# Patient Record
Sex: FEMALE | Race: WHITE | HISPANIC OR LATINO | Employment: FULL TIME | URBAN - METROPOLITAN AREA
[De-identification: names, ages, dates, MRNs, and addresses within clinical notes are randomized per-mention and may not be internally consistent; named-entity substitution may affect disease eponyms.]

---

## 2019-08-02 ENCOUNTER — OFFICE VISIT (OUTPATIENT)
Dept: FAMILY MEDICINE CLINIC | Facility: CLINIC | Age: 29
End: 2019-08-02

## 2019-08-02 VITALS
HEIGHT: 65 IN | RESPIRATION RATE: 16 BRPM | TEMPERATURE: 98.5 F | HEART RATE: 74 BPM | SYSTOLIC BLOOD PRESSURE: 118 MMHG | BODY MASS INDEX: 44.35 KG/M2 | WEIGHT: 266.2 LBS | DIASTOLIC BLOOD PRESSURE: 78 MMHG

## 2019-08-02 DIAGNOSIS — E66.01 CLASS 3 SEVERE OBESITY WITH BODY MASS INDEX (BMI) OF 40.0 TO 44.9 IN ADULT, UNSPECIFIED OBESITY TYPE, UNSPECIFIED WHETHER SERIOUS COMORBIDITY PRESENT (HCC): ICD-10-CM

## 2019-08-02 DIAGNOSIS — Z13.1 SCREENING FOR DIABETES MELLITUS: ICD-10-CM

## 2019-08-02 DIAGNOSIS — Z00.00 HEALTHCARE MAINTENANCE: Primary | ICD-10-CM

## 2019-08-02 DIAGNOSIS — R42 LIGHTHEADEDNESS: ICD-10-CM

## 2019-08-02 DIAGNOSIS — Z13.220 SCREENING FOR HYPERLIPIDEMIA: ICD-10-CM

## 2019-08-02 PROBLEM — E66.813 CLASS 3 SEVERE OBESITY WITH BODY MASS INDEX (BMI) OF 40.0 TO 44.9 IN ADULT (HCC): Status: ACTIVE | Noted: 2019-08-02

## 2019-08-02 PROCEDURE — 99385 PREV VISIT NEW AGE 18-39: CPT | Performed by: FAMILY MEDICINE

## 2019-08-02 RX ORDER — IBUPROFEN 800 MG/1
800 TABLET ORAL EVERY 6 HOURS PRN
COMMUNITY

## 2019-08-02 NOTE — ASSESSMENT & PLAN NOTE
Lightheadedness dizziness and nausea likely from not eating or drinking all day  Advised patient to continue to eat a regular breakfast and lunch along with keeping well hydrated throughout the day  Follow-up in 4 weeks  Should lightheadedness and dizziness not improve on lifestyle modifications consider other causes such as vertigo

## 2019-08-02 NOTE — PROGRESS NOTES
Assessment/Plan:    Healthcare maintenance  - Lipid panel for hyperlipidemia screening  -Cmp for diabetes screening   - Depressionsscreening negative  -Low risk sexual behavior  STD screening declined   - Last pap 4 years ago  Will make daniel with women's health for one  Lightheadedness  Lightheadedness dizziness and nausea likely from not eating or drinking all day  Advised patient to continue to eat a regular breakfast and lunch along with keeping well hydrated throughout the day  Follow-up in 4 weeks  Should lightheadedness and dizziness not improve on lifestyle modifications consider other causes such as vertigo  Class 3 severe obesity with body mass index (BMI) of 40 0 to 44 9 in [facility])  Counseled patient on  healthy diet and exercise  She says she has no time to exercise  Her diet is poor which says she will try to improve in the next few weeks  Problem List Items Addressed This Visit        Other    Healthcare maintenance - Primary     - Lipid panel for hyperlipidemia screening  -Cmp for diabetes screening   - Depressionsscreening negative  -Low risk sexual behavior  STD screening declined   - Last pap 4 years ago  Will make daniel with women's health for one  Lightheadedness     Lightheadedness dizziness and nausea likely from not eating or drinking all day  Advised patient to continue to eat a regular breakfast and lunch along with keeping well hydrated throughout the day  Follow-up in 4 weeks  Should lightheadedness and dizziness not improve on lifestyle modifications consider other causes such as vertigo  Class 3 severe obesity with body mass index (BMI) of 40 0 to 44 9 in [facility])     Counseled patient on  healthy diet and exercise  She says she has no time to exercise  Her diet is poor which says she will try to improve in the next few weeks             Other Visit Diagnoses     Screening for diabetes mellitus        Relevant Orders    Comprehensive metabolic panel            Subjective:      Patient ID: Prashanth Oswald is a 30 y o  female  Estephanie Zheng is a 21-year-old female presents to clinic to establish care  She has no significant medical history  No surgical hx   No hospitalizations  in the past  Currently not taking any medications  Today she says she has been dizzy/lightheaded and felt nauseous for the last 2 weeks  She says this started after she came back from vacation  She admits not eating or drinking anything most  days because of work  She does some strenuous activity at work  She says she feels much better after eating  Dizziness/lightheadedness  is not related to anything specific  It is not worse with changes in position  She does not feel like the room is spinning   No ear pain or ringing in the ears  No vomiting no fevers chills palpitations shortness of breath chest pain syncope headaches visual disturbances  Social hx: social drinker, no tobacco or recreational drug use  Currently sexual active with 1 partner  Her last Pap smear was 4 years ago  The following portions of the patient's history were reviewed and updated as appropriate: allergies, current medications, past family history, past medical history, past social history, past surgical history and problem list     Review of Systems   Constitutional: Negative for chills, diaphoresis and fever  HENT: Negative for tinnitus  Eyes: Negative for visual disturbance  Respiratory: Negative for shortness of breath  Cardiovascular: Negative for chest pain and palpitations  Gastrointestinal: Positive for nausea  Negative for abdominal pain, diarrhea and vomiting  Neurological: Positive for dizziness and light-headedness  Negative for syncope and headaches  All other systems reviewed and are negative          Objective:      /78 (BP Location: Left arm, Patient Position: Sitting, Cuff Size: Standard)   Pulse 74   Temp 98 5 °F (36 9 °C) (Tympanic)   Resp 16    5' 4 5" (1 638 m)   Wt 121 kg (266 lb 3 2 oz)   BMI 44 99 kg/m²          Physical Exam   Constitutional: She is oriented to person, place, and time  No distress  HENT:   Right Ear: External ear normal    Left Ear: External ear normal    Mouth/Throat: Oropharynx is clear and moist    Eyes: Conjunctivae are normal    Neck: Normal range of motion  Neck supple  Cardiovascular: Normal rate, regular rhythm and normal heart sounds  Pulmonary/Chest: Effort normal and breath sounds normal  No respiratory distress  Abdominal: Soft  Bowel sounds are normal    Musculoskeletal: Normal range of motion  Neurological: She is alert and oriented to person, place, and time  No cranial nerve deficit  Skin: She is not diaphoretic  Nursing note and vitals reviewed

## 2019-08-02 NOTE — ASSESSMENT & PLAN NOTE
Counseled patient on  healthy diet and exercise  She says she has no time to exercise  Her diet is poor which says she will try to improve in the next few weeks

## 2019-08-02 NOTE — ASSESSMENT & PLAN NOTE
- Lipid panel for hyperlipidemia screening  -Cmp for diabetes screening   - Depressionsscreening negative  -Low risk sexual behavior  STD screening declined   - Last pap 4 years ago  Will make daniel with women's health for one

## 2020-06-26 ENCOUNTER — OFFICE VISIT (OUTPATIENT)
Dept: FAMILY MEDICINE CLINIC | Facility: CLINIC | Age: 30
End: 2020-06-26

## 2020-06-26 ENCOUNTER — TELEPHONE (OUTPATIENT)
Dept: DERMATOLOGY | Facility: CLINIC | Age: 30
End: 2020-06-26

## 2020-06-26 VITALS
RESPIRATION RATE: 16 BRPM | DIASTOLIC BLOOD PRESSURE: 76 MMHG | TEMPERATURE: 99 F | BODY MASS INDEX: 45.8 KG/M2 | WEIGHT: 271 LBS | HEART RATE: 80 BPM | SYSTOLIC BLOOD PRESSURE: 108 MMHG

## 2020-06-26 DIAGNOSIS — R22.0 SUPERFICIAL SWELLING OF SCALP: Primary | ICD-10-CM

## 2020-06-26 PROCEDURE — 99214 OFFICE O/P EST MOD 30 MIN: CPT | Performed by: FAMILY MEDICINE

## 2020-06-26 PROCEDURE — 1036F TOBACCO NON-USER: CPT | Performed by: FAMILY MEDICINE

## 2020-06-26 RX ORDER — KETOCONAZOLE 20 MG/ML
1 SHAMPOO TOPICAL 2 TIMES WEEKLY
Qty: 100 ML | Refills: 0 | Status: SHIPPED | OUTPATIENT
Start: 2020-06-29 | End: 2020-07-20

## 2020-07-20 DIAGNOSIS — R22.0 SUPERFICIAL SWELLING OF SCALP: ICD-10-CM

## 2020-07-20 RX ORDER — KETOCONAZOLE 20 MG/ML
1 SHAMPOO TOPICAL 2 TIMES WEEKLY
Qty: 120 ML | Refills: 0 | Status: SHIPPED | OUTPATIENT
Start: 2020-07-20 | End: 2020-08-11 | Stop reason: SDUPTHER

## 2020-08-11 ENCOUNTER — OFFICE VISIT (OUTPATIENT)
Dept: FAMILY MEDICINE CLINIC | Facility: CLINIC | Age: 30
End: 2020-08-11

## 2020-08-11 VITALS
WEIGHT: 275.6 LBS | RESPIRATION RATE: 16 BRPM | TEMPERATURE: 99.7 F | SYSTOLIC BLOOD PRESSURE: 120 MMHG | DIASTOLIC BLOOD PRESSURE: 70 MMHG | HEART RATE: 82 BPM | BODY MASS INDEX: 46.58 KG/M2

## 2020-08-11 DIAGNOSIS — R22.0 SUPERFICIAL SWELLING OF SCALP: ICD-10-CM

## 2020-08-11 DIAGNOSIS — Z11.4 SCREENING FOR HIV (HUMAN IMMUNODEFICIENCY VIRUS): Primary | ICD-10-CM

## 2020-08-11 PROCEDURE — 99213 OFFICE O/P EST LOW 20 MIN: CPT | Performed by: FAMILY MEDICINE

## 2020-08-11 PROCEDURE — 1036F TOBACCO NON-USER: CPT | Performed by: FAMILY MEDICINE

## 2020-08-11 RX ORDER — KETOCONAZOLE 20 MG/ML
1 SHAMPOO TOPICAL 2 TIMES WEEKLY
Qty: 120 ML | Refills: 2 | Status: SHIPPED | OUTPATIENT
Start: 2020-08-13

## 2020-08-11 NOTE — PROGRESS NOTES
Assessment/Plan:    Superficial swelling of scalp  Improved after ketoconazole shampoo for 4 weeks, hair drying, not tying hair tight  and removing certain hair products  Recommend to continue shampoo and lifestyle modifications  Her scalp is not as swollen  I would say swelling has reduced about 70-75%  It is also not erythematous like it was will   She will make a follow-up appointment with dermatology and will also follow-up in 4 weeks with me in case she cannot see dermatology  Problem List Items Addressed This Visit        Other    Superficial swelling of scalp     Improved after ketoconazole shampoo for 4 weeks, hair drying, not tying hair tight  and removing certain hair products  Recommend to continue shampoo and lifestyle modifications  Her scalp is not as swollen  I would say swelling has reduced about 70-75%  It is also not erythematous like it was will   She will make a follow-up appointment with dermatology and will also follow-up in 4 weeks with me in case she cannot see dermatology  Relevant Medications    ketoconazole (NIZORAL) 2 % shampoo (Start on 8/13/2020)      Other Visit Diagnoses     Screening for HIV (human immunodeficiency virus)    -  Primary            Subjective:      Patient ID: Aly Samano is a 35 y o  female  Today's visit is a follow-up on scalp swelling  At last visit ordered ketoconazole shampoo to be used and patient was supposed to start drying her hair and not  tie her hair as tight  I also referred her to Dermatology however she could not get an appointment and was referred to a different office in [place]  Today she say this scalp swelling is better  She has been using the ketoconazole shampoo and has been drying her hair  She realized that coconut oil can clog her pores and a lot of the products she uses as coconut oil  Since she has stopped using them she feels her hair and scalp is better  Redness and swelling have improved        The following portions of the patient's history were reviewed and updated as appropriate: allergies, current medications, past family history, past medical history, past social history, past surgical history and problem list     Review of Systems   Constitutional: Negative for chills and fever  Gastrointestinal: Negative for nausea and vomiting  Skin: Negative for color change, pallor, rash and wound  Neurological: Negative for dizziness, light-headedness and headaches  All other systems reviewed and are negative  Objective:      /70   Pulse 82   Temp 99 7 °F (37 6 °C)   Resp 16   Wt 125 kg (275 lb 9 6 oz)   BMI 46 58 kg/m²          Physical Exam  Vitals signs and nursing note reviewed  Constitutional:       Appearance: Normal appearance  Eyes:      Extraocular Movements: Extraocular movements intact  Conjunctiva/sclera: Conjunctivae normal    Neck:      Musculoskeletal: Normal range of motion and neck supple  Cardiovascular:      Rate and Rhythm: Normal rate and regular rhythm  Pulmonary:      Effort: Pulmonary effort is normal       Breath sounds: Normal breath sounds  Lymphadenopathy:      Cervical: No cervical adenopathy  Skin:     General: Skin is warm and dry  Capillary Refill: Capillary refill takes less than 2 seconds  Findings: No erythema or rash  Comments: Trace scalp swelling  Very little redness  Nontender to palpation  Neurological:      Mental Status: She is alert     Psychiatric:         Mood and Affect: Mood normal

## 2020-08-20 ENCOUNTER — OFFICE VISIT (OUTPATIENT)
Dept: FAMILY MEDICINE CLINIC | Facility: CLINIC | Age: 30
End: 2020-08-20

## 2020-08-20 VITALS
BODY MASS INDEX: 46.95 KG/M2 | RESPIRATION RATE: 18 BRPM | HEIGHT: 64 IN | WEIGHT: 275 LBS | TEMPERATURE: 99.9 F | DIASTOLIC BLOOD PRESSURE: 78 MMHG | SYSTOLIC BLOOD PRESSURE: 120 MMHG | HEART RATE: 80 BPM

## 2020-08-20 DIAGNOSIS — H60.501 ACUTE OTITIS EXTERNA OF RIGHT EAR, UNSPECIFIED TYPE: Primary | ICD-10-CM

## 2020-08-20 PROCEDURE — 3008F BODY MASS INDEX DOCD: CPT | Performed by: FAMILY MEDICINE

## 2020-08-20 PROCEDURE — 1036F TOBACCO NON-USER: CPT | Performed by: FAMILY MEDICINE

## 2020-08-20 PROCEDURE — 99213 OFFICE O/P EST LOW 20 MIN: CPT | Performed by: FAMILY MEDICINE

## 2020-08-20 RX ORDER — OFLOXACIN 3 MG/ML
4 SOLUTION AURICULAR (OTIC) 2 TIMES DAILY
Qty: 10 ML | Refills: 0 | Status: SHIPPED | OUTPATIENT
Start: 2020-08-20 | End: 2020-08-27

## 2020-08-20 RX ORDER — ANTIPYRINE 100 %
10 POWDER (GRAM) MISCELLANEOUS 3 TIMES DAILY PRN
Qty: 1 BOTTLE | Refills: 1 | Status: SHIPPED | OUTPATIENT
Start: 2020-08-20

## 2020-08-20 NOTE — PROGRESS NOTES
Assessment/Plan:    Acute otitis externa of right ear  Tenderness to palpation with manipulation of external ear  Small mass with yellow drainage seen in ear canal   TM normal   No fever chills or signs of mastoiditis  Ofloxacin  drops for 7 days  Ibuprofen and antipyrine drops PRN for pain  ED precautions discussed  Follow-up in 1 week         Problem List Items Addressed This Visit        Nervous and Auditory    Acute otitis externa of right ear - Primary     Tenderness to palpation with manipulation of external ear  Small mass with yellow drainage seen in ear canal   TM normal   No fever chills or signs of mastoiditis  Ofloxacin  drops for 7 days  Ibuprofen and antipyrine drops PRN for pain  ED precautions discussed  Follow-up in 1 week         Relevant Medications    Antipyrine RUTH    ofloxacin (FLOXIN) 0 3 % otic solution            Subjective:      Patient ID: Lanre Vieira is a 35 y o  female  Ear pain and drainage for 1 day  It hurts to wear masks she has not been able to wear the earlobe around it  Also hurts when she touches outside of her ear  She has not recently been swimming  She has not tried any medication for the pain  She denies any fever chills tenderness hearing loss nausea vomiting or systemic signs of infection  She says she had at ear infection about 2 years ago and that to give her ear drops  Earache    Associated symptoms include ear discharge  Pertinent negatives include no headaches, hearing loss or vomiting  The following portions of the patient's history were reviewed and updated as appropriate: allergies, current medications, past family history, past medical history, past social history, past surgical history and problem list     Review of Systems   Constitutional: Negative for chills, diaphoresis and fever  HENT: Positive for ear discharge and ear pain  Negative for congestion, dental problem, facial swelling, hearing loss and tinnitus      Gastrointestinal: Negative for nausea and vomiting  Neurological: Negative for dizziness, tremors, syncope, facial asymmetry, light-headedness, numbness and headaches  All other systems reviewed and are negative  Objective:      /78 (BP Location: Left arm, Patient Position: Sitting, Cuff Size: Large)   Pulse 80   Temp 99 9 °F (37 7 °C) (Tympanic)   Resp 18   Ht 5' 4" (1 626 m)   Wt 125 kg (275 lb)   BMI 47 20 kg/m²          Physical Exam  Vitals signs and nursing note reviewed  HENT:      Right Ear: Tympanic membrane normal  There is no impacted cerumen  Left Ear: Tympanic membrane, ear canal and external ear normal  There is no impacted cerumen  Ears:      Comments: Right TM normal however can see small mass in ear canal with yellow drainage  Tragus looks a little red and is tender to palpation  Tenderness to palpation with manipulation of external ear  Mastoid nontender to palpation  Eyes:      Extraocular Movements: Extraocular movements intact  Conjunctiva/sclera: Conjunctivae normal    Neck:      Musculoskeletal: Normal range of motion and neck supple  Cardiovascular:      Rate and Rhythm: Normal rate and regular rhythm  Pulmonary:      Effort: Pulmonary effort is normal       Breath sounds: Normal breath sounds  Lymphadenopathy:      Cervical: No cervical adenopathy

## 2020-08-20 NOTE — ASSESSMENT & PLAN NOTE
Tenderness to palpation with manipulation of external ear    Small mass with yellow drainage seen in ear canal   TM normal   No fever chills or signs of mastoiditis  Ofloxacin  drops for 7 days  Ibuprofen and antipyrine drops PRN for pain  ED precautions discussed  Follow-up in 1 week

## 2020-08-21 ENCOUNTER — TELEPHONE (OUTPATIENT)
Dept: FAMILY MEDICINE CLINIC | Facility: CLINIC | Age: 30
End: 2020-08-21

## 2020-08-21 NOTE — TELEPHONE ENCOUNTER
Pt calling stating she went to  scripts however the Antipyrine was not covered under her [employer] and she wanted to know if there was anything else they could prescribe   Alyson Rae can be reached at [phone]

## 2020-08-22 ENCOUNTER — OFFICE VISIT (OUTPATIENT)
Dept: URGENT CARE | Age: 30
End: 2020-08-22
Payer: COMMERCIAL

## 2020-08-22 ENCOUNTER — NURSE TRIAGE (OUTPATIENT)
Dept: OTHER | Facility: OTHER | Age: 30
End: 2020-08-22

## 2020-08-22 VITALS
BODY MASS INDEX: 46.1 KG/M2 | HEIGHT: 64 IN | OXYGEN SATURATION: 98 % | DIASTOLIC BLOOD PRESSURE: 86 MMHG | HEART RATE: 96 BPM | TEMPERATURE: 98.4 F | RESPIRATION RATE: 18 BRPM | WEIGHT: 270 LBS | SYSTOLIC BLOOD PRESSURE: 144 MMHG

## 2020-08-22 DIAGNOSIS — H60.503 ACUTE OTITIS EXTERNA OF BOTH EARS, UNSPECIFIED TYPE: Primary | ICD-10-CM

## 2020-08-22 PROCEDURE — 87205 SMEAR GRAM STAIN: CPT | Performed by: PREVENTIVE MEDICINE

## 2020-08-22 PROCEDURE — 87070 CULTURE OTHR SPECIMN AEROBIC: CPT | Performed by: PREVENTIVE MEDICINE

## 2020-08-22 PROCEDURE — 96372 THER/PROPH/DIAG INJ SC/IM: CPT | Performed by: PREVENTIVE MEDICINE

## 2020-08-22 PROCEDURE — G0382 LEV 3 HOSP TYPE B ED VISIT: HCPCS | Performed by: PREVENTIVE MEDICINE

## 2020-08-22 PROCEDURE — 99283 EMERGENCY DEPT VISIT LOW MDM: CPT | Performed by: PREVENTIVE MEDICINE

## 2020-08-22 RX ORDER — ACETAMINOPHEN AND CODEINE PHOSPHATE 300; 30 MG/1; MG/1
1 TABLET ORAL EVERY 6 HOURS PRN
Qty: 20 TABLET | Refills: 0 | Status: SHIPPED | OUTPATIENT
Start: 2020-08-22

## 2020-08-22 RX ORDER — CIPROFLOXACIN 500 MG/1
500 TABLET, FILM COATED ORAL EVERY 12 HOURS SCHEDULED
Qty: 10 TABLET | Refills: 0 | Status: SHIPPED | COMMUNITY
Start: 2020-08-22 | End: 2020-08-27

## 2020-08-22 RX ORDER — KETOROLAC TROMETHAMINE 30 MG/ML
30 INJECTION, SOLUTION INTRAMUSCULAR; INTRAVENOUS ONCE
Status: COMPLETED | OUTPATIENT
Start: 2020-08-22 | End: 2020-08-22

## 2020-08-22 RX ADMIN — KETOROLAC TROMETHAMINE 30 MG: 30 INJECTION, SOLUTION INTRAMUSCULAR; INTRAVENOUS at 20:29

## 2020-08-22 NOTE — LETTER
August 22, 2020     Patient: Sergio Valladares   YOB: 1990   Date of Visit: 8/22/2020       To Whom It May Concern: It is my medical opinion that Sergio Valladares may return to work on 8/24/2020  Please excuse her for work on 8/22-8/23       If you have any questions or concerns, please don't hesitate to call           Sincerely,        Moira Martines MD    CC: No Recipients

## 2020-08-22 NOTE — TELEPHONE ENCOUNTER
Regarding: possible ear infections  ----- Message from [employer] sent at 8/22/2020  1:42 PM EDT -----  Pt has a very bad ear ache in her right ear and was given medication   States that the drops are not working and that it is now bothering her left ear

## 2020-08-22 NOTE — TELEPHONE ENCOUNTER
Reason for Disposition   [1] SEVERE pain and [2] not improved 2 hours after analgesic medication (e g , ibuprofen or acetaminophen)    Additional Information   [1] Ear  infection (Otitis Media) AND [2] taking an antibiotic    Answer Assessment - Initial Assessment Questions  1  INFECTION: "What infection is the antibiotic being given for?"      Ear infection  2  ANTIBIOTIC: "What antibiotic are you taking" "How many times per day?"      Ofloxacin started Thursday and she did not take it today because she states that it is making it worse  3  DURATION: "When was the antibiotic started?"     Thursday  4  MAIN CONCERN OR SYMPTOM:  "What is your main concern right now?"      Pain in right ear and now left ear too  5  BETTER-SAME-WORSE: "Are you getting better, staying the same, or getting worse compared to when you first started the antibiotics?" If getting worse, ask: "In what way?"       worse  6  FEVER: "Do you have a fever?" If so, ask: "What is your temperature, how was it measured, and when did it start?"      No fever  7  SYMPTOMS: "Are there any other symptoms you're concerned about?" If so, ask: "When did it start?"      Pressure increasing in both ears and swelling outside of right ear and slight outside of left ear  8  FOLLOW-UP APPOINTMENT: "Do you have a follow-up appointment with your doctor?"  8/27/2020 in office    Answer Assessment - Initial Assessment Questions  1  ANTIBIOTIC: "What antibiotic are you receiving?" "How many times per day?"      None orally  2  ONSET: "When was the antibiotic started?"      Thursday -ear drops  3  PAIN: "How bad is the pain?"   (Scale 1-10; mild, moderate or severe)    - MILD (1-3): doesn't interfere with normal activities     - MODERATE (4-7): interferes with normal activities or awakens from sleep     - SEVERE (8-10): excruciating pain, unable to do any normal activities       Moderate  4   FEVER: "Do you have a fever?" If so, ask: "What is your temperature, how was it measured, and when did it start?"      None  5  DISCHARGE: "Is there any discharge from the ear?"      Yellowish drainage  6   OTHER SYMPTOMS: "Do you have any other symptoms?" (e g , headache, stiff neck, dizziness, vomiting, runny nose)      Pressure in both ears    Protocols used: EAR - OTITIS MEDIA FOLLOW-UP CALL-ADULT-, INFECTION ON ANTIBIOTIC FOLLOW-UP CALL-ADULT-

## 2020-08-23 NOTE — PATIENT INSTRUCTIONS
Otitis Externa   AMBULATORY CARE:   Otitis externa , or swimmer's ear, is an infection in the outer ear canal  This canal goes from the outside of the ear to the eardrum  Common signs and symptoms include the following:   · Ear pain    · Outer ear canal is red and swollen    · Clear fluid or pus is leaking out of your ear    · Outer ear canal is itchy and you see a rash    · Trouble hearing because your ear is plugged    · Feel a bump in your ear canal, called a polyp    · Flakes of skin fall from your ear  Seek care immediately if:   · You have severe ear pain  · You are suddenly unable to hear at all  · You have new swelling in your face, behind your ears, or in your neck  · You suddenly cannot move part of your face  · Your face suddenly feels numb  Contact your healthcare provider if:   · You have a fever  · Your signs and symptoms do not get better after 2 days of treatment  · Your signs and symptoms go away for a time, but then come back  · You have questions or concerns about your condition or care  Treatment for otitis externa  may include any of the following:  · NSAIDs , such as ibuprofen, help decrease swelling, pain, and fever  This medicine is available with or without a doctor's order  NSAIDs can cause stomach bleeding or kidney problems in certain people  If you take blood thinner medicine, always ask if NSAIDs are safe for you  Always read the medicine label and follow directions  Do not give these medicines to children under 10months of age without direction from your child's healthcare provider  · Acetaminophen  decreases pain and fever  It is available without a doctor's order  Ask how much to take and how often to take it  Follow directions  Acetaminophen can cause liver damage if not taken correctly  · Ear drops  that contain an antibiotic may be given  The antibiotic helps treat a bacterial infection  You may also be given steroid medicine   The steroid helps decrease redness, swelling, and pain  · Ear wicking  removes fluid or wax from your outer ear canal  Healthcare providers may insert a small tube, called a wick, into your ear to help drain fluid  A wick also may be used to put medicine into your ear canal if the canal is blocked  Follow the steps below to use eardrops:   · Lie down on your side with your infected ear facing up  · Carefully drip the correct number of eardrops into your ear  Have another person help you if possible  · Gently move the outside part of your ear back and forth to help the medicine reach your ear canal      · Stay lying down in the same position (with your ear facing up) for 3 to 5 minutes  Prevent otitis externa:   · Do not put cotton swabs or foreign objects in your ears  · Wrap a clean moist washcloth around your finger, and use it to clean your outer ear and remove extra ear wax  · Use ear plugs when you swim  Dry your outer ears completely after you swim or bathe  Follow up with your healthcare provider as directed:  Write down your questions so you remember to ask them during your visits  © 2017 [employer] [employer] [facility] [employer] Information is for End User's use only and may not be sold, redistributed or otherwise used for commercial purposes  All illustrations and images included in CareNotes® are the copyrighted property of [employer]  or Kervin Katz  The above information is an  only  It is not intended as medical advice for individual conditions or treatments  Talk to your doctor, nurse or pharmacist before following any medical regimen to see if it is safe and effective for you

## 2020-08-25 LAB
BACTERIA WND AEROBE CULT: ABNORMAL
GRAM STN SPEC: ABNORMAL
GRAM STN SPEC: ABNORMAL

## 2020-08-25 NOTE — PROGRESS NOTES
[facility]        NAME: Mandie Sanabria is a 35 y o  female  : 1990    MRN: [id number]  DATE: 2020  TIME: 8:01 PM    Assessment and Plan   Acute otitis externa of both ears, unspecified type [H60 503]  1  Acute otitis externa of both ears, unspecified type  Ambulatory Referral to Otolaryngology    ciprofloxacin (CIPRO) 500 mg tablet    acetaminophen-codeine (TYLENOL #3) 300-30 mg per tablet    ketorolac (TORADOL) injection 30 mg    Wound culture and Gram stain         Patient Instructions       Follow up with PCP in 3-5 days  Proceed to  ER if symptoms worsen  Chief Complaint     Chief Complaint   Patient presents with    Earache     b/l ear pain          History of Present Illness       Earache    There is pain in both ears  This is a new problem  The current episode started in the past 7 days  The problem occurs constantly  The problem has been unchanged  There has been no fever  The pain is at a severity of 8/10  The pain is severe  Associated symptoms include ear discharge and hearing loss  She has tried ear drops for the symptoms  The treatment provided no relief  Her past medical history is significant for a chronic ear infection  Review of Systems   Review of Systems   Constitutional: Negative  HENT: Positive for ear discharge, ear pain and hearing loss  Respiratory: Negative  Cardiovascular: Negative  All other systems reviewed and are negative          Current Medications       Current Outpatient Medications:     levonorgestrel (MIRENA) 20 MCG/24HR IUD, 1 each by Intrauterine route, Disp: , Rfl:     ofloxacin (FLOXIN) 0 3 % otic solution, Administer 4 drops to the right ear 2 (two) times a day for 7 days, Disp: 10 mL, Rfl: 0    acetaminophen-codeine (TYLENOL #3) 300-30 mg per tablet, Take 1 tablet by mouth every 6 (six) hours as needed for moderate pain, Disp: 20 tablet, Rfl: 0    Antipyrine RUTH, 10 application by Does not apply route 3 (three) times a day as needed (pain) (Patient not taking: Reported on 8/22/2020), Disp: 1 Bottle, Rfl: 1    ciprofloxacin (CIPRO) 500 mg tablet, Take 1 tablet (500 mg total) by mouth every 12 (twelve) hours for 5 days, Disp: 10 tablet, Rfl: 0    ibuprofen (MOTRIN) 800 mg tablet, Take 800 mg by mouth every 6 (six) hours as needed for mild pain, Disp: , Rfl:     ketoconazole (NIZORAL) 2 % shampoo, Apply 1 application topically 2 (two) times a week (Patient not taking: Reported on 8/22/2020), Disp: 120 mL, Rfl: 2    Current Allergies     Allergies as of 08/22/2020    (No Known Allergies)            The following portions of the patient's history were reviewed and updated as appropriate: allergies, current medications, past family history, past medical history, past social history, past surgical history and problem list      Past Medical History:   Diagnosis Date    Known health problems: none        History reviewed  No pertinent surgical history  Family History   Problem Relation Age of Onset    Breast cancer Maternal Grandmother          Medications have been verified  Objective   /86   Pulse 96   Temp 98 4 °F (36 9 °C)   Resp 18   Ht 5' 4" (1 626 m)   Wt 122 kg (270 lb)   SpO2 98%   BMI 46 35 kg/m²        Physical Exam     Physical Exam  Vitals signs and nursing note reviewed  Constitutional:       Appearance: She is well-developed  HENT:      Right Ear: Drainage, swelling and tenderness present  Left Ear: Drainage, swelling and tenderness present  Cardiovascular:      Rate and Rhythm: Normal rate and regular rhythm  Pulmonary:      Effort: Pulmonary effort is normal       Breath sounds: Normal breath sounds

## 2021-03-02 ENCOUNTER — LAB REQUISITION (OUTPATIENT)
Dept: LAB | Facility: HOSPITAL | Age: 31
End: 2021-03-02
Payer: COMMERCIAL

## 2021-03-02 DIAGNOSIS — H60.332 SWIMMER'S EAR, LEFT EAR: ICD-10-CM

## 2021-03-02 PROCEDURE — 87077 CULTURE AEROBIC IDENTIFY: CPT | Performed by: SPECIALIST

## 2021-03-02 PROCEDURE — 87205 SMEAR GRAM STAIN: CPT | Performed by: SPECIALIST

## 2021-03-02 PROCEDURE — 87106 FUNGI IDENTIFICATION YEAST: CPT | Performed by: SPECIALIST

## 2021-03-02 PROCEDURE — 87070 CULTURE OTHR SPECIMN AEROBIC: CPT | Performed by: SPECIALIST

## 2021-03-06 LAB
BACTERIA EAR AEROBE CULT: ABNORMAL
GRAM STN SPEC: ABNORMAL

## 2023-10-02 ENCOUNTER — VBI (OUTPATIENT)
Dept: ADMINISTRATIVE | Facility: OTHER | Age: 33
End: 2023-10-02

## 2024-02-21 PROBLEM — H60.501 ACUTE OTITIS EXTERNA OF RIGHT EAR: Status: RESOLVED | Noted: 2020-08-20 | Resolved: 2024-02-21

## 2024-02-21 PROBLEM — Z00.00 HEALTHCARE MAINTENANCE: Status: RESOLVED | Noted: 2019-08-02 | Resolved: 2024-02-21

## 2024-04-15 ENCOUNTER — APPOINTMENT (OUTPATIENT)
Dept: URGENT CARE | Age: 34
End: 2024-04-15

## 2024-04-15 ENCOUNTER — CLINICAL SUPPORT (OUTPATIENT)
Dept: URGENT CARE | Age: 34
End: 2024-04-15

## 2024-04-15 DIAGNOSIS — Z23 ENCOUNTER FOR IMMUNIZATION: ICD-10-CM

## 2024-04-15 PROCEDURE — 86480 TB TEST CELL IMMUN MEASURE: CPT

## 2024-04-16 LAB
GAMMA INTERFERON BACKGROUND BLD IA-ACNC: 0.02 IU/ML
M TB IFN-G BLD-IMP: NEGATIVE
M TB IFN-G CD4+ BCKGRND COR BLD-ACNC: 0 IU/ML
M TB IFN-G CD4+ BCKGRND COR BLD-ACNC: 0.02 IU/ML
MITOGEN IGNF BCKGRD COR BLD-ACNC: 9.98 IU/ML

## 2024-09-10 ENCOUNTER — VBI (OUTPATIENT)
Dept: ADMINISTRATIVE | Facility: OTHER | Age: 34
End: 2024-09-10

## 2024-09-10 NOTE — TELEPHONE ENCOUNTER
09/10/24 2:55 PM     Chart reviewed for Pap Smear (HPV) aka Cervical Cancer Screening ; nothing is submitted to the patient's insurance at this time.     Yesi Melol MA   [facility]

## 2025-03-31 ENCOUNTER — VBI (OUTPATIENT)
Dept: ADMINISTRATIVE | Facility: OTHER | Age: 35
End: 2025-03-31

## 2025-03-31 NOTE — TELEPHONE ENCOUNTER
03/31/25 10:19 AM     Chart reviewed for Pap Smear (HPV) aka Cervical Cancer Screening ; nothing is submitted to the patient's insurance at this time.     Messi Aaron MA   [facility]